# Patient Record
Sex: MALE | Race: WHITE | NOT HISPANIC OR LATINO | Employment: OTHER | URBAN - METROPOLITAN AREA
[De-identification: names, ages, dates, MRNs, and addresses within clinical notes are randomized per-mention and may not be internally consistent; named-entity substitution may affect disease eponyms.]

---

## 2017-04-05 DIAGNOSIS — F32.A DEPRESSION: ICD-10-CM

## 2017-04-05 DIAGNOSIS — F06.30 ORGANIC AFFECTIVE SYNDROME: ICD-10-CM

## 2017-04-05 DIAGNOSIS — I69.119 COGNITIVE DEFICIT DUE TO OLD INTRACEREBRAL HEMORRHAGE: ICD-10-CM

## 2017-04-05 DIAGNOSIS — R41.89 COGNITIVE CHANGE: ICD-10-CM

## 2017-04-05 RX ORDER — CITALOPRAM 20 MG/1
20 TABLET, FILM COATED ORAL DAILY
Qty: 30 TABLET | Refills: 11 | Status: SHIPPED | OUTPATIENT
Start: 2017-04-05 | End: 2018-03-20 | Stop reason: SDUPTHER

## 2017-04-05 RX ORDER — DONEPEZIL HYDROCHLORIDE 5 MG/1
5 TABLET, FILM COATED ORAL EVERY MORNING
Qty: 30 TABLET | Refills: 11 | Status: SHIPPED | OUTPATIENT
Start: 2017-04-05 | End: 2018-03-20 | Stop reason: SDUPTHER

## 2017-06-13 RX ORDER — ALPRAZOLAM 0.5 MG/1
TABLET ORAL
Qty: 60 TABLET | Refills: 5 | Status: SHIPPED | OUTPATIENT
Start: 2017-06-13 | End: 2018-06-01 | Stop reason: SDUPTHER

## 2017-06-28 ENCOUNTER — OFFICE VISIT (OUTPATIENT)
Dept: NEUROLOGY | Facility: CLINIC | Age: 82
End: 2017-06-28
Payer: MEDICARE

## 2017-06-28 VITALS
HEIGHT: 72 IN | HEART RATE: 77 BPM | BODY MASS INDEX: 28.13 KG/M2 | WEIGHT: 207.69 LBS | SYSTOLIC BLOOD PRESSURE: 110 MMHG | DIASTOLIC BLOOD PRESSURE: 67 MMHG

## 2017-06-28 DIAGNOSIS — F32.A DEPRESSION, UNSPECIFIED DEPRESSION TYPE: ICD-10-CM

## 2017-06-28 DIAGNOSIS — I69.119 COGNITIVE DEFICIT DUE TO OLD INTRACEREBRAL HEMORRHAGE: Primary | ICD-10-CM

## 2017-06-28 DIAGNOSIS — F06.30 ORGANIC AFFECTIVE SYNDROME: ICD-10-CM

## 2017-06-28 DIAGNOSIS — H90.3 BILATERAL SENSORINEURAL HEARING LOSS: ICD-10-CM

## 2017-06-28 PROCEDURE — 1126F AMNT PAIN NOTED NONE PRSNT: CPT | Mod: ,,, | Performed by: PSYCHIATRY & NEUROLOGY

## 2017-06-28 PROCEDURE — 99999 PR PBB SHADOW E&M-EST. PATIENT-LVL III: CPT | Mod: PBBFAC,,, | Performed by: PSYCHIATRY & NEUROLOGY

## 2017-06-28 PROCEDURE — 99214 OFFICE O/P EST MOD 30 MIN: CPT | Mod: S$PBB,,, | Performed by: PSYCHIATRY & NEUROLOGY

## 2017-06-28 PROCEDURE — 1159F MED LIST DOCD IN RCRD: CPT | Mod: ,,, | Performed by: PSYCHIATRY & NEUROLOGY

## 2017-06-28 PROCEDURE — 99213 OFFICE O/P EST LOW 20 MIN: CPT | Mod: PBBFAC | Performed by: PSYCHIATRY & NEUROLOGY

## 2017-06-28 RX ORDER — MULTIVITAMIN
1 TABLET ORAL
COMMUNITY
End: 2021-01-01

## 2017-06-28 RX ORDER — ROSUVASTATIN CALCIUM 20 MG/1
20 TABLET, COATED ORAL
COMMUNITY
End: 2018-11-16 | Stop reason: SDUPTHER

## 2017-06-28 RX ORDER — NAPROXEN SODIUM 220 MG/1
81 TABLET, FILM COATED ORAL
COMMUNITY
End: 2018-07-26

## 2017-06-28 RX ORDER — CLOPIDOGREL BISULFATE 75 MG/1
75 TABLET ORAL
COMMUNITY
End: 2018-11-16 | Stop reason: SDUPTHER

## 2017-06-28 RX ORDER — TELMISARTAN 20 MG/1
20 TABLET ORAL
COMMUNITY
End: 2018-11-16

## 2017-06-28 RX ORDER — SERTRALINE HYDROCHLORIDE 100 MG/1
TABLET, FILM COATED ORAL
COMMUNITY
Start: 2015-04-13 | End: 2018-07-26

## 2017-06-28 RX ORDER — FLUTICASONE PROPIONATE 50 MCG
SPRAY, SUSPENSION (ML) NASAL
COMMUNITY
Start: 2015-04-13 | End: 2018-07-26

## 2017-06-28 NOTE — PROGRESS NOTES
Subjective:       Patient ID: Rahul Patel is a 82 y.o. male.    Chief Complaint:  Memory Loss      History of Present Illness  HPI  This is an 82-year-old male who returns in follow-up of memory difficulties. The patient was apparently doing well and working as an  until he had a stroke while in [place] visiting relatives in August 2013. A workup done was consistent with a left temporal intracerebral bleed. He has gradually improved with this physical therapy and occupational therapy to a point that he was able to carry on a conversation but had some word finding difficulties and recall difficulties. Motor and sensory symptoms had essentially resolved.     His spouse was present today.  His behavior problems are significantly improved since he moved down to [place] with his spouse.  He is presently on Seroquel 50 mg 2 tablets twice a day.  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  He is also on citalopram 20 mg daily and Xanax as needed for anxiety attacks.  He keeps fairly active however does not like to get up with house very much.  No new medical problems are reported.  He is continuing to follow-up with cardiology..      Review of Systems  Review of Systems   Constitutional: Negative.    HENT: Negative.  Negative for hearing loss.    Eyes: Negative.  Negative for visual disturbance.   Respiratory: Negative.  Negative for shortness of breath.    Cardiovascular: Negative.  Negative for chest pain and palpitations.   Gastrointestinal: Negative.    Endocrine: Negative.    Genitourinary: Negative.    Musculoskeletal: Negative.  Negative for back pain and gait problem.   Skin: Negative.    Allergic/Immunologic: Negative.    Neurological: Positive for speech difficulty. Negative for dizziness, tremors, seizures, syncope, weakness, numbness and headaches.   Hematological: Negative.     Psychiatric/Behavioral: Positive for decreased concentration and sleep disturbance. The patient is nervous/anxious.        Objective:      Neurologic Exam    Physical Exam   Constitutional: He appears well-developed and well-nourished.   HENT:   Head: Normocephalic and atraumatic.   Right Ear: Hearing normal.   Left Ear: Hearing normal.   Eyes:   Fundus examination shows sharp disc margins.   Neck: Normal range of motion. Neck supple. Carotid bruit is not present.   Neurological: He is alert. He displays abnormal reflex (DTRs generally depressed and there appely more pronounced  on the right.). He displays no atrophy. No cranial nerve deficit (Visual fields at bedside testing essentially normal.  No facial asymmetry noted with facial movements and sensory exam being normal/symmetrical.  Corneals/gag reflexes normal.  Tongue & palate movements normal.  Hearing unimpaired.  Shoulder shrug was norm) or sensory deficit. He exhibits normal muscle tone. He displays a negative Romberg sign. Coordination and gait normal. He displays no Babinski's sign on the right side. He displays no Babinski's sign on the left side.   Mental status examination: Patient is fully oriented and able to give an adequate history.  Recall of recent and past information is good.  Immediate recall is normal.  Attention span and concentration was normal.    Judgment and insight is normal.  Speech is hesitant and he has some difficulty with expressiveness and with word finding.  Comprehension is unimpaired.  Affect is appropriate, mood was even.  No thought disorder is noted.         Assessment:        1. 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 Organic affective syndrome    3. Bilateral sensorineural hearing loss    4. Depression, unspecified depression type             Plan:       Discussed with spouse and patient.  Advised him to continue his present exercise program and present level of activities.  Advised spouse to  consider decreasing the Seroquel to 50 mg in the morning and 100 mg at bedtime unless his behaviors returned.  No medication changes otherwise.  Follow-up in 6 months.

## 2017-09-12 ENCOUNTER — TELEPHONE (OUTPATIENT)
Dept: NEUROLOGY | Facility: CLINIC | Age: 82
End: 2017-09-12

## 2017-09-12 NOTE — TELEPHONE ENCOUNTER
----- Message from Belen Hudson sent at 9/12/2017  2:10 PM CDT -----  Contact: Faby Patel (Spouse)  _x  1st Request  _  2nd Request  _  3rd Request      Who: Faby Patel (Spouse)    Why: pt spouse would like a call back regarding patient health states patient had a very small seizure on Saturday afternoon. Patient spouse states patient has not seen doctor and would like clinical staff advice. Please call     What Number to Call Back: [phone]    When to Expect a call back: (With in 24 hours)

## 2017-09-12 NOTE — TELEPHONE ENCOUNTER
Discussed with patient's spouse.  He apparently had an episode when after having had lunch he got out of the car and fell to the and held onto the car but did not lose consciousness or fall to the ground.  He was then helped by a bystander back into his car by which time he had recovered completely.  The patient had not complained of any headaches or visual difficulties and reported that he felt as if this was related to something he ate.  No convulsive movements noted otherwise.  This does not have the appearance of his seizure and may represent a near syncopal episode or a TIA.  She is advised to contact his cardiologist regarding this episode and see if he can get the carotid ultrasound done as he is being set up to get an echocardiogram done by his cardiologist.  She will contact me after he sees the cardiologist.

## 2017-11-06 DIAGNOSIS — F06.30 ORGANIC AFFECTIVE SYNDROME: ICD-10-CM

## 2017-11-06 RX ORDER — QUETIAPINE FUMARATE 50 MG/1
TABLET, FILM COATED ORAL
Qty: 120 TABLET | Refills: 11 | Status: SHIPPED | OUTPATIENT
Start: 2017-11-06 | End: 2018-11-16 | Stop reason: SDUPTHER

## 2018-01-09 ENCOUNTER — OFFICE VISIT (OUTPATIENT)
Dept: NEUROLOGY | Facility: CLINIC | Age: 83
End: 2018-01-09
Payer: MEDICARE

## 2018-01-09 VITALS
DIASTOLIC BLOOD PRESSURE: 61 MMHG | HEIGHT: 72 IN | BODY MASS INDEX: 29.09 KG/M2 | SYSTOLIC BLOOD PRESSURE: 115 MMHG | WEIGHT: 214.75 LBS | HEART RATE: 80 BPM

## 2018-01-09 DIAGNOSIS — I69.120 APHASIA DUE TO OLD INTRACEREBRAL HEMORRHAGE: Primary | ICD-10-CM

## 2018-01-09 DIAGNOSIS — F06.30 ORGANIC AFFECTIVE SYNDROME: ICD-10-CM

## 2018-01-09 DIAGNOSIS — I69.119 COGNITIVE DEFICIT DUE TO OLD INTRACEREBRAL HEMORRHAGE: ICD-10-CM

## 2018-01-09 DIAGNOSIS — I25.10 CORONARY ARTERY DISEASE INVOLVING NATIVE CORONARY ARTERY WITHOUT ANGINA PECTORIS, UNSPECIFIED WHETHER NATIVE OR TRANSPLANTED HEART: ICD-10-CM

## 2018-01-09 DIAGNOSIS — H90.3 BILATERAL SENSORINEURAL HEARING LOSS: ICD-10-CM

## 2018-01-09 PROCEDURE — 99213 OFFICE O/P EST LOW 20 MIN: CPT | Mod: PBBFAC | Performed by: PSYCHIATRY & NEUROLOGY

## 2018-01-09 PROCEDURE — 99999 PR PBB SHADOW E&M-EST. PATIENT-LVL III: CPT | Mod: PBBFAC,,, | Performed by: PSYCHIATRY & NEUROLOGY

## 2018-01-09 PROCEDURE — 99214 OFFICE O/P EST MOD 30 MIN: CPT | Mod: S$PBB,,, | Performed by: PSYCHIATRY & NEUROLOGY

## 2018-01-09 NOTE — PATIENT INSTRUCTIONS
Discussed with spouse and patient.  Advised him to continue his present exercise program and present level of activities.  Continue Seroquel with no changes.  No medication changes otherwise.

## 2018-01-09 NOTE — PROGRESS NOTES
Subjective:       Patient ID: Rahul Patel is a 82 y.o. male.    Chief Complaint:  Memory Loss      History of Present Illness  HPI  This is an 82-year-old male who returns in follow-up of memory difficulties. The patient was apparently doing well and working as an  until he had a stroke while in [place] visiting relatives in August 2013. A workup done was consistent with a left temporal intracerebral bleed. He has gradually improved with this physical therapy and occupational therapy to a point that he was able to carry on a conversation but had some word finding difficulties and recall difficulties. Motor and sensory symptoms had essentially resolved.     His spouse was present today.  His behavior problems are significantly improved since he moved down to [place] with his spouse.  He is presently on Seroquel 50 mg 2 tablets twice a day.  She did try reducing Seroquel however started to get irritable and occasionally agitated and she restarted the Seroquel with excellent response.  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  He is also on citalopram 20 mg daily and Xanax as needed for anxiety attacks which he has had to use very rarely.  He keeps fairly active however does not like to get up with house very much.  No new medical problems are reported.  He is continuing to follow-up with cardiology.      Review of Systems  Review of Systems   Constitutional: Negative.    HENT: Negative.  Negative for hearing loss.    Eyes: Negative.  Negative for visual disturbance.   Respiratory: Negative.  Negative for shortness of breath.    Cardiovascular: Negative.  Negative for chest pain and palpitations.   Gastrointestinal: Negative.    Endocrine: Negative.    Genitourinary: Negative.    Musculoskeletal: Negative.  Negative for back pain and gait problem.   Skin: Negative.    Allergic/Immunologic: Negative.     Neurological: Positive for speech difficulty. Negative for dizziness, tremors, seizures, syncope, weakness, numbness and headaches.   Hematological: Negative.    Psychiatric/Behavioral: Positive for decreased concentration and sleep disturbance. The patient is nervous/anxious.        Objective:      Neurologic Exam    Physical Exam   Constitutional: He appears well-developed and well-nourished.   HENT:   Head: Normocephalic and atraumatic.   Right Ear: Hearing normal.   Left Ear: Hearing normal.   Eyes:   Fundus examination shows sharp disc margins.   Neck: Normal range of motion. Neck supple. Carotid bruit is not present.   Neurological: He is alert. He displays abnormal reflex (DTRs generally depressed and there appely more pronounced  on the right.). He displays no atrophy. No cranial nerve deficit (Visual fields at bedside testing essentially normal.  No facial asymmetry noted with facial movements and sensory exam being normal/symmetrical.  Corneals/gag reflexes normal.  Tongue & palate movements normal.  Hearing unimpaired.  Shoulder shrug was norm) or sensory deficit. He exhibits normal muscle tone. He displays a negative Romberg sign. Coordination and gait normal. He displays no Babinski's sign on the right side. He displays no Babinski's sign on the left side.   Mental status examination: Patient is fully oriented and able to give an adequate history.  Recall of recent and past information is good.  Immediate recall is normal.  Attention span and concentration was normal.    Judgment and insight is normal.  Speech is hesitant and he has some difficulty with expressiveness and with word finding.  Comprehension is unimpaired.  Affect is appropriate, mood was even.  No thought disorder is noted.         Assessment:        1. 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 Organic affective syndrome    4. Bilateral sensorineural hearing loss    5.  Coronary artery disease involving native coronary artery without angina pectoris, unspecified whether native or transplanted heart             Plan:       Discussed with spouse and patient.  Advised him to continue his present exercise program and present level of activities.  Continue Seroquel with no changes.  No medication changes otherwise.  Follow-up in 6 months.

## 2018-03-20 DIAGNOSIS — F32.A DEPRESSION: ICD-10-CM

## 2018-03-20 DIAGNOSIS — F06.30 ORGANIC AFFECTIVE SYNDROME: ICD-10-CM

## 2018-03-20 DIAGNOSIS — I69.119 COGNITIVE DEFICIT DUE TO OLD INTRACEREBRAL HEMORRHAGE: ICD-10-CM

## 2018-03-20 DIAGNOSIS — R41.89 COGNITIVE CHANGE: ICD-10-CM

## 2018-03-20 RX ORDER — CITALOPRAM 20 MG/1
TABLET, FILM COATED ORAL
Qty: 30 TABLET | Refills: 11 | Status: SHIPPED | OUTPATIENT
Start: 2018-03-20 | End: 2018-11-16 | Stop reason: SDUPTHER

## 2018-03-20 RX ORDER — DONEPEZIL HYDROCHLORIDE 5 MG/1
TABLET, FILM COATED ORAL
Qty: 30 TABLET | Refills: 11 | Status: SHIPPED | OUTPATIENT
Start: 2018-03-20 | End: 2018-11-16 | Stop reason: SDUPTHER

## 2018-06-01 RX ORDER — ALPRAZOLAM 0.5 MG/1
TABLET ORAL
Qty: 60 TABLET | Refills: 5 | Status: SHIPPED | OUTPATIENT
Start: 2018-06-01 | End: 2018-07-26

## 2018-07-26 PROBLEM — R42 POSTURAL DIZZINESS: Status: ACTIVE | Noted: 2018-07-26

## 2018-11-16 PROBLEM — R42 POSTURAL DIZZINESS: Status: RESOLVED | Noted: 2018-07-26 | Resolved: 2018-11-16

## 2021-02-11 PROBLEM — I60.9 SAH (SUBARACHNOID HEMORRHAGE): Status: ACTIVE | Noted: 2021-01-01

## 2021-02-11 PROBLEM — Z71.89 ACP (ADVANCE CARE PLANNING): Status: ACTIVE | Noted: 2021-01-01

## 2021-02-12 PROBLEM — R50.9 FEVER: Status: ACTIVE | Noted: 2021-01-01
